# Patient Record
Sex: MALE | Race: WHITE | URBAN - METROPOLITAN AREA
[De-identification: names, ages, dates, MRNs, and addresses within clinical notes are randomized per-mention and may not be internally consistent; named-entity substitution may affect disease eponyms.]

---

## 2018-04-12 ENCOUNTER — EMERGENCY (EMERGENCY)
Facility: HOSPITAL | Age: 83
LOS: 1 days | Discharge: HOME | End: 2018-04-12
Attending: EMERGENCY MEDICINE | Admitting: EMERGENCY MEDICINE
Payer: MEDICARE

## 2018-04-12 VITALS
TEMPERATURE: 98 F | RESPIRATION RATE: 18 BRPM | DIASTOLIC BLOOD PRESSURE: 55 MMHG | OXYGEN SATURATION: 96 % | HEART RATE: 71 BPM | SYSTOLIC BLOOD PRESSURE: 97 MMHG

## 2018-04-12 DIAGNOSIS — Z79.899 OTHER LONG TERM (CURRENT) DRUG THERAPY: ICD-10-CM

## 2018-04-12 DIAGNOSIS — R41.82 ALTERED MENTAL STATUS, UNSPECIFIED: ICD-10-CM

## 2018-04-12 DIAGNOSIS — I10 ESSENTIAL (PRIMARY) HYPERTENSION: ICD-10-CM

## 2018-04-12 DIAGNOSIS — F10.129 ALCOHOL ABUSE WITH INTOXICATION, UNSPECIFIED: ICD-10-CM

## 2018-04-12 DIAGNOSIS — Z79.82 LONG TERM (CURRENT) USE OF ASPIRIN: ICD-10-CM

## 2018-04-12 DIAGNOSIS — I48.91 UNSPECIFIED ATRIAL FIBRILLATION: ICD-10-CM
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
## 2018-04-12 DIAGNOSIS — E78.5 HYPERLIPIDEMIA, UNSPECIFIED: ICD-10-CM

## 2018-04-12 LAB
ALBUMIN SERPL ELPH-MCNC: 3.7 G/DL — SIGNIFICANT CHANGE UP (ref 3.4–5)
ALBUMIN SERPL ELPH-MCNC: 3.9 G/DL — SIGNIFICANT CHANGE UP (ref 3.4–5)
ALP SERPL-CCNC: 72 U/L — SIGNIFICANT CHANGE UP (ref 40–120)
ALP SERPL-CCNC: 76 U/L — SIGNIFICANT CHANGE UP (ref 40–120)
ALT FLD-CCNC: 29 U/L — SIGNIFICANT CHANGE UP (ref 12–42)
ALT FLD-CCNC: 32 U/L — SIGNIFICANT CHANGE UP (ref 12–42)
ANION GAP SERPL CALC-SCNC: 16 MMOL/L — SIGNIFICANT CHANGE UP (ref 9–16)
ANION GAP SERPL CALC-SCNC: 20 MMOL/L — HIGH (ref 9–16)
APPEARANCE UR: CLEAR — SIGNIFICANT CHANGE UP
APTT BLD: 21.1 SEC — LOW (ref 27.5–36.5)
AST SERPL-CCNC: 30 U/L — SIGNIFICANT CHANGE UP (ref 15–37)
AST SERPL-CCNC: 33 U/L — SIGNIFICANT CHANGE UP (ref 15–37)
BASOPHILS NFR BLD AUTO: 1.1 % — SIGNIFICANT CHANGE UP (ref 0–2)
BILIRUB SERPL-MCNC: 1 MG/DL — SIGNIFICANT CHANGE UP (ref 0.2–1.2)
BILIRUB SERPL-MCNC: 1 MG/DL — SIGNIFICANT CHANGE UP (ref 0.2–1.2)
BILIRUB UR-MCNC: NEGATIVE — SIGNIFICANT CHANGE UP
BUN SERPL-MCNC: 28 MG/DL — HIGH (ref 7–23)
BUN SERPL-MCNC: 29 MG/DL — HIGH (ref 7–23)
CALCIUM SERPL-MCNC: 8.8 MG/DL — SIGNIFICANT CHANGE UP (ref 8.5–10.5)
CALCIUM SERPL-MCNC: 9.2 MG/DL — SIGNIFICANT CHANGE UP (ref 8.5–10.5)
CHLORIDE SERPL-SCNC: 100 MMOL/L — SIGNIFICANT CHANGE UP (ref 96–108)
CHLORIDE SERPL-SCNC: 97 MMOL/L — SIGNIFICANT CHANGE UP (ref 96–108)
CO2 SERPL-SCNC: 16 MMOL/L — LOW (ref 22–31)
CO2 SERPL-SCNC: 20 MMOL/L — LOW (ref 22–31)
COLOR SPEC: YELLOW — SIGNIFICANT CHANGE UP
CREAT SERPL-MCNC: 1.69 MG/DL — HIGH (ref 0.5–1.3)
CREAT SERPL-MCNC: 1.9 MG/DL — HIGH (ref 0.5–1.3)
DIFF PNL FLD: NEGATIVE — SIGNIFICANT CHANGE UP
EOSINOPHIL NFR BLD AUTO: 2 % — SIGNIFICANT CHANGE UP (ref 0–6)
ETHANOL SERPL-MCNC: 237 MG/DL — HIGH
GLUCOSE SERPL-MCNC: 86 MG/DL — SIGNIFICANT CHANGE UP (ref 70–99)
GLUCOSE SERPL-MCNC: 91 MG/DL — SIGNIFICANT CHANGE UP (ref 70–99)
GLUCOSE UR QL: NEGATIVE — SIGNIFICANT CHANGE UP
HCT VFR BLD CALC: 41.2 % — SIGNIFICANT CHANGE UP (ref 39–50)
HGB BLD-MCNC: 14.2 G/DL — SIGNIFICANT CHANGE UP (ref 13–17)
IMM GRANULOCYTES NFR BLD AUTO: 0.4 % — SIGNIFICANT CHANGE UP (ref 0–1.5)
INR BLD: 0.89 — SIGNIFICANT CHANGE UP (ref 0.88–1.16)
KETONES UR-MCNC: NEGATIVE — SIGNIFICANT CHANGE UP
LEUKOCYTE ESTERASE UR-ACNC: NEGATIVE — SIGNIFICANT CHANGE UP
LIDOCAIN IGE QN: 187 U/L — SIGNIFICANT CHANGE UP (ref 73–393)
LYMPHOCYTES # BLD AUTO: 22.9 % — SIGNIFICANT CHANGE UP (ref 13–44)
MAGNESIUM SERPL-MCNC: 2.3 MG/DL — SIGNIFICANT CHANGE UP (ref 1.6–2.6)
MCHC RBC-ENTMCNC: 32.3 PG — SIGNIFICANT CHANGE UP (ref 27–34)
MCHC RBC-ENTMCNC: 34.5 G/DL — SIGNIFICANT CHANGE UP (ref 32–36)
MCV RBC AUTO: 93.6 FL — SIGNIFICANT CHANGE UP (ref 80–100)
MONOCYTES NFR BLD AUTO: 6.7 % — SIGNIFICANT CHANGE UP (ref 2–14)
NEUTROPHILS NFR BLD AUTO: 66.9 % — SIGNIFICANT CHANGE UP (ref 43–77)
NITRITE UR-MCNC: NEGATIVE — SIGNIFICANT CHANGE UP
PCO2 BLDV: 38 MMHG — LOW (ref 41–51)
PH BLDV: 7.38 — SIGNIFICANT CHANGE UP (ref 7.32–7.43)
PH UR: 6 — SIGNIFICANT CHANGE UP (ref 5–8)
PLATELET # BLD AUTO: 171 K/UL — SIGNIFICANT CHANGE UP (ref 150–400)
PO2 BLDV: 36 MMHG — SIGNIFICANT CHANGE UP (ref 35–40)
POTASSIUM SERPL-MCNC: 3.9 MMOL/L — SIGNIFICANT CHANGE UP (ref 3.5–5.3)
POTASSIUM SERPL-MCNC: 4.7 MMOL/L — SIGNIFICANT CHANGE UP (ref 3.5–5.3)
POTASSIUM SERPL-SCNC: 3.9 MMOL/L — SIGNIFICANT CHANGE UP (ref 3.5–5.3)
POTASSIUM SERPL-SCNC: 4.7 MMOL/L — SIGNIFICANT CHANGE UP (ref 3.5–5.3)
PROT SERPL-MCNC: 7 G/DL — SIGNIFICANT CHANGE UP (ref 6.4–8.2)
PROT SERPL-MCNC: 7.2 G/DL — SIGNIFICANT CHANGE UP (ref 6.4–8.2)
PROT UR-MCNC: NEGATIVE MG/DL — SIGNIFICANT CHANGE UP
PROTHROM AB SERPL-ACNC: 9.8 SEC — SIGNIFICANT CHANGE UP (ref 9.8–12.7)
RBC # BLD: 4.4 M/UL — SIGNIFICANT CHANGE UP (ref 4.2–5.8)
RBC # FLD: 12.2 % — SIGNIFICANT CHANGE UP (ref 10.3–16.9)
SAO2 % BLDV: 62 % — SIGNIFICANT CHANGE UP
SODIUM SERPL-SCNC: 133 MMOL/L — SIGNIFICANT CHANGE UP (ref 132–145)
SODIUM SERPL-SCNC: 136 MMOL/L — SIGNIFICANT CHANGE UP (ref 132–145)
SP GR SPEC: 1.01 — SIGNIFICANT CHANGE UP (ref 1–1.03)
TROPONIN I SERPL-MCNC: <0.017 NG/ML — LOW (ref 0.02–0.06)
UROBILINOGEN FLD QL: 0.2 E.U./DL — SIGNIFICANT CHANGE UP
WBC # BLD: 8.4 K/UL — SIGNIFICANT CHANGE UP (ref 3.8–10.5)
WBC # FLD AUTO: 8.4 K/UL — SIGNIFICANT CHANGE UP (ref 3.8–10.5)

## 2018-04-12 PROCEDURE — 70450 CT HEAD/BRAIN W/O DYE: CPT | Mod: 26

## 2018-04-12 PROCEDURE — 99284 EMERGENCY DEPT VISIT MOD MDM: CPT

## 2018-04-12 PROCEDURE — 93010 ELECTROCARDIOGRAM REPORT: CPT

## 2018-04-12 PROCEDURE — 71045 X-RAY EXAM CHEST 1 VIEW: CPT | Mod: 26

## 2018-04-12 PROCEDURE — 99218: CPT | Mod: 25

## 2018-04-12 PROCEDURE — 93010 ELECTROCARDIOGRAM REPORT: CPT | Mod: 76,XU

## 2018-04-12 RX ORDER — SODIUM CHLORIDE 9 MG/ML
1000 INJECTION INTRAMUSCULAR; INTRAVENOUS; SUBCUTANEOUS ONCE
Qty: 0 | Refills: 0 | Status: COMPLETED | OUTPATIENT
Start: 2018-04-12 | End: 2018-04-12

## 2018-04-12 RX ORDER — METOPROLOL TARTRATE 50 MG
0 TABLET ORAL
Qty: 0 | Refills: 0 | COMMUNITY

## 2018-04-12 RX ORDER — ATORVASTATIN CALCIUM 80 MG/1
0 TABLET, FILM COATED ORAL
Qty: 0 | Refills: 0 | COMMUNITY

## 2018-04-12 RX ORDER — SODIUM CHLORIDE 9 MG/ML
1000 INJECTION INTRAMUSCULAR; INTRAVENOUS; SUBCUTANEOUS
Qty: 0 | Refills: 0 | Status: DISCONTINUED | OUTPATIENT
Start: 2018-04-12 | End: 2018-04-16

## 2018-04-12 RX ORDER — ASPIRIN/CALCIUM CARB/MAGNESIUM 324 MG
0 TABLET ORAL
Qty: 0 | Refills: 0 | COMMUNITY

## 2018-04-12 RX ORDER — METOPROLOL TARTRATE 50 MG
25 TABLET ORAL ONCE
Qty: 0 | Refills: 0 | Status: COMPLETED | OUTPATIENT
Start: 2018-04-12 | End: 2018-04-12

## 2018-04-12 RX ADMIN — Medication 25 MILLIGRAM(S): at 18:12

## 2018-04-12 RX ADMIN — SODIUM CHLORIDE 2000 MILLILITER(S): 9 INJECTION INTRAMUSCULAR; INTRAVENOUS; SUBCUTANEOUS at 17:49

## 2018-04-12 NOTE — ED PROVIDER NOTE - PROGRESS NOTE DETAILS
Pt w/ a fib new onset. Pt currently not on anti coagulation. Will be admitted overnight for cardiac monitoring, rate control, gentle hydration and electrolyte management as needed. Spoke to PMD Dr Umer [phone], aware that pt is in ED. Agreeable w plan to Obs and hydrate, repeat labs and follow monitoring. Will place on Obs for cardiac monitoring and hydration, cardio eval in AM

## 2018-04-12 NOTE — ED ADULT TRIAGE NOTE - CHIEF COMPLAINT QUOTE
Pt. picked up at [facility] after being found lying on the floor by [facility]. Pt. admits to drinking alcohol today, denies hitting his head, no visible injuries noted.

## 2018-04-12 NOTE — ED PROVIDER NOTE - MEDICAL DECISION MAKING DETAILS
UA, EKG, blood work, fluids and CAT scan. Will do UA, EKG, blood work, fluids and CT scan. Will check labs, EKG, and CT. Gentle hydration.

## 2018-04-12 NOTE — ED ADULT NURSE REASSESSMENT NOTE - NS ED NURSE REASSESS COMMENT FT1
Pt BP noted low, notified MD Wilson, instructed to administer metoprolol as ordered. will continue to monitor via continuous pulse oximetry and cardiac monitor.

## 2018-04-12 NOTE — ED ADULT NURSE REASSESSMENT NOTE - NS ED NURSE REASSESS COMMENT FT1
pt noted in A-fib, placed on cardiac monitor. pt reports he has been told he has a-fib before, reports taking Aspirin daily, denies use of any other blood thinners. pt noted in A-fib, placed on cardiac monitor. pt reports he has been told he has a-fib before, reports taking Aspirin daily, denies use of any other blood thinners, pt reports taking metoprolol today.

## 2018-04-12 NOTE — ED ADULT NURSE NOTE - CAS EDN DISCHARGE ASSESSMENT
steady gait/Alert and oriented to person, place and time/No adverse reaction to first time med in ED/Symptoms improved

## 2018-04-12 NOTE — ED CDU PROVIDER INITIAL DAY NOTE - MEDICAL DECISION MAKING DETAILS
Plan: obs for cardiac monitoring, repeat labs, gentle hydration, cardio eval in AM. NPO after midnight. New onset afib

## 2018-04-12 NOTE — ED PROVIDER NOTE - NEUROLOGICAL, MLM
minor slurred speech, gate not assessed, normal strength in all extremities, symmetric smile, alert and oriented.

## 2018-04-12 NOTE — ED ADULT NURSE REASSESSMENT NOTE - NS ED NURSE REASSESS COMMENT FT1
no further complaints offered at this time.  second set cardiac enzymes resulted.  Remains in stretcher on cardiac monitor.  Hemodynamic stability noted.

## 2018-04-12 NOTE — ED PROVIDER NOTE - DIAGNOSTIC INTERPRETATION
Chest x-ray, 1 view  Lungs clear, heart shadow normal, bony structures normal, no free air under diaphragm, no PTX

## 2018-04-12 NOTE — ED ADULT NURSE NOTE - OBJECTIVE STATEMENT
pt is an 84 year old male who comes into the ED after being found at [facility] on the floor. pt states "I was having lunch with my , he came late and I had a few too many drinks, I then went to [facility] and my knees gave out". pt denies falling, reports people helped him to the floor. denies hitting his head, LOC. pt a&ox3. no trauma noted. denies dizziness, sob, chest pain, weakness.

## 2018-04-12 NOTE — ED ADULT NURSE NOTE - CHIEF COMPLAINT QUOTE
Pt. picked up at [facility] after being found lying on the floor by [facility]. Pt. admits to drinking alcohol today, denies hitting his head, no visible injuries noted.

## 2018-04-12 NOTE — ED PROVIDER NOTE - OBJECTIVE STATEMENT
83 y/o M w/ PMHx of HTN, HLD and atrial fib (5yr ago) presents to ED w/ c/o leg weakness brought via EMS. Pt reports going down the stairs in [place], losing strength in legs and stumbling down. Pt admits having about 5/6 drinks prior to occurrence. Pt denies hitting his head, SOB, N/Vm abd pain.  Pt also reports being on aspirin 81mg and seeing a neurologist once for peripheral nerve testing per tingling/pain in L leg. 83 y/o M w/ PMHx of HTN, HLD and atrial fib (5yr ago) was brought by EMS for AMS after being found at [place] on the floor. Pt recalls falling down the stairs which he attributes to leg weakness. Pt admits having about 5/6 drinks prior to occurrence. Pt denies any pain, hitting his head, SOB, N/Vm, and abd pain.  Pt also reports being on aspirin 81mg and seeing a neurologist once for peripheral nerve testing per tingling/pain in L leg.

## 2018-04-13 VITALS
SYSTOLIC BLOOD PRESSURE: 112 MMHG | HEART RATE: 89 BPM | RESPIRATION RATE: 16 BRPM | DIASTOLIC BLOOD PRESSURE: 82 MMHG | OXYGEN SATURATION: 99 % | TEMPERATURE: 98 F

## 2018-04-13 LAB
CK MB BLD-MCNC: 1.83 % — SIGNIFICANT CHANGE UP
CK MB CFR SERPL CALC: 2.8 NG/ML — SIGNIFICANT CHANGE UP (ref 0.5–3.6)
CK SERPL-CCNC: 153 U/L — SIGNIFICANT CHANGE UP (ref 39–308)
TROPONIN I SERPL-MCNC: <0.017 NG/ML — LOW (ref 0.02–0.06)

## 2018-04-13 PROCEDURE — 99217: CPT

## 2018-04-13 RX ORDER — APIXABAN 2.5 MG/1
2.5 TABLET, FILM COATED ORAL ONCE
Qty: 0 | Refills: 0 | Status: COMPLETED | OUTPATIENT
Start: 2018-04-13 | End: 2018-04-13

## 2018-04-13 RX ORDER — METOPROLOL TARTRATE 50 MG
2.5 TABLET ORAL ONCE
Qty: 0 | Refills: 0 | Status: COMPLETED | OUTPATIENT
Start: 2018-04-13 | End: 2018-04-13

## 2018-04-13 RX ORDER — APIXABAN 2.5 MG/1
1 TABLET, FILM COATED ORAL
Qty: 28 | Refills: 0 | OUTPATIENT
Start: 2018-04-13 | End: 2018-04-26

## 2018-04-13 RX ORDER — METOPROLOL TARTRATE 50 MG
50 TABLET ORAL ONCE
Qty: 0 | Refills: 0 | Status: COMPLETED | OUTPATIENT
Start: 2018-04-13 | End: 2018-04-13

## 2018-04-13 RX ORDER — ONDANSETRON 8 MG/1
4 TABLET, FILM COATED ORAL ONCE
Qty: 0 | Refills: 0 | Status: COMPLETED | OUTPATIENT
Start: 2018-04-13 | End: 2018-04-13

## 2018-04-13 RX ADMIN — SODIUM CHLORIDE 250 MILLILITER(S): 9 INJECTION INTRAMUSCULAR; INTRAVENOUS; SUBCUTANEOUS at 10:25

## 2018-04-13 RX ADMIN — Medication 50 MILLIGRAM(S): at 08:28

## 2018-04-13 RX ADMIN — APIXABAN 2.5 MILLIGRAM(S): 2.5 TABLET, FILM COATED ORAL at 09:29

## 2018-04-13 RX ADMIN — ONDANSETRON 4 MILLIGRAM(S): 8 TABLET, FILM COATED ORAL at 01:35

## 2018-04-13 RX ADMIN — Medication 10 MILLIGRAM(S): at 02:35

## 2018-04-13 RX ADMIN — Medication 2.5 MILLIGRAM(S): at 09:24

## 2018-04-13 RX ADMIN — SODIUM CHLORIDE 250 MILLILITER(S): 9 INJECTION INTRAMUSCULAR; INTRAVENOUS; SUBCUTANEOUS at 09:24

## 2018-04-13 RX ADMIN — SODIUM CHLORIDE 250 MILLILITER(S): 9 INJECTION INTRAMUSCULAR; INTRAVENOUS; SUBCUTANEOUS at 08:28

## 2018-04-13 NOTE — ED ADULT NURSE REASSESSMENT NOTE - NS ED NURSE REASSESS COMMENT FT1
received report, pt on observation for AMS/ETOH with AFIB. Currently pt is A&ox3, offers no complaints of CP or SOB. Currently Pt is AFIB on monitor 100-115, awaiting cardiology evaluation. Pt admits to heavy etoh at a "business lunch" yesterday, and inconsistency with Metoprolol 50mg at home. Will discuss with MD and f/u as indicated. Fall precautions, call bell remains in reach.

## 2018-04-13 NOTE — ED CDU PROVIDER SUBSEQUENT DAY NOTE - PROGRESS NOTE DETAILS
Observed overnight, improved renal function w hydration, 2 sets of normal trop, EKG still afib rate controlled. Evaluated by Cardio in AM, normal EF on ECHO, moderate aortic insuf. Deemed safe to take eliquis bid, rate control (already on metoprolol), discussed case with his cardiologist Dr Dumont in AM as well and he will be able to foolow up w pt within then next 2 weeks. Explained to pt risk of bleeding on eliquis, if any abnormal bleeding RTER, if any head trauma RTER for evaluation, reduce drinking since that is a risk for falls.

## 2018-04-13 NOTE — ED ADULT NURSE REASSESSMENT NOTE - NS ED NURSE REASSESS COMMENT FT1
pt with improvement in vitals, started on Eliquis PO. Pt provided education in length by both writer and MD on blood thinners. Pt verbalized understanding. Pt will be discharged once meal is competed, he plans to take cab to [facility] where he has family meeting him at the [facility] to go home.

## 2018-04-13 NOTE — ED ADULT NURSE REASSESSMENT NOTE - NS ED NURSE REASSESS COMMENT FT1
pt vomiting in the ed. given zofran 4mg IVP. Placed on continuous telemetry. Pending blood work done. Updated with plan of care.

## 2018-04-13 NOTE — CONSULT NOTE ADULT - SUBJECTIVE AND OBJECTIVE BOX
[facility] Cardiology Consultation    CHIEF COMPLAINT: palpitaitons    HISTORY OF PRESENT ILLNESS: 85 y/o male with history of HTN presents secondary to dizziness. Patient noted to be intoxicated. He was in AF. It's unknown if this is an old or new finding. He remarks on possibly having these symptoms in the past. He follows with a Dr Dumont at [place].     PAST MEDICAL & SURGICAL HISTORY:  HTN  Dilated aortic root    Allergies No Known Allergies    MEDICATIONS:  metoprolol  exforge     FAMILY HISTORY: no CAD    SOCIAL HISTORY:  social EtOH, tobacco or drug use    REVIEW OF SYSTEMS:  CONSTITUTIONAL: No fever, weight loss, or fatigue  EYES: No eye pain, visual disturbances, or discharge  ENMT:  No difficulty hearing, tinnitus, vertigo; No sinus or throat pain  NECK: No pain or stiffness  BREASTS: No pain, masses, or nipple discharge  RESPIRATORY: No cough, wheezing, chills or hemoptysis; No Shortness of Breath  CARDIOVASCULAR: No chest pain, palpitations, dizziness, or leg swelling  GASTROINTESTINAL: No abdominal or epigastric pain. No nausea, vomiting, or hematemesis; No diarrhea or constipation. No melena or hematochezia.  GENITOURINARY: No dysuria, frequency, hematuria, or incontinence  NEUROLOGICAL: No headaches, memory loss, loss of strength, numbness, or tremors  SKIN: No itching, burning, rashes, or lesions   LYMPH Nodes: No enlarged glands  ENDOCRINE: No heat or cold intolerance; No hair loss  MUSCULOSKELETAL: No joint pain or swelling; No muscle, back, or extremity pain  PSYCHIATRIC: No depression, anxiety, mood swings, or difficulty sleeping  HEME/LYMPH: No easy bruising, or bleeding gums  ALLERY AND IMMUNOLOGIC: No hives or eczema	      PHYSICAL EXAM:  T(C): 36.7 (04-13-18 @ 09:45), Max: 37.1 (04-12-18 @ 21:35)  HR: 89 (04-13-18 @ 09:45) (71 - 115)  BP: 112/82 (04-13-18 @ 09:45) (94/60 - 134/61)  RR: 16 (04-13-18 @ 09:45) (16 - 20)  SpO2: 99% (04-13-18 @ 09:45) (95% - 99%)  Appearance: elderly man NAD  HEENT:   Normal oral mucosa, PERRL, EOMI	  Lymphatic: No lymphadenopathy  Cardiovascular: Normal S1 S2, No JVD, No murmurs, No edema  Respiratory: Lungs clear to auscultation	  Psychiatry: A & O x 3, Mood & affect appropriate  Gastrointestinal:  Soft, Non-tender, + BS	  Skin: No rashes, No ecchymoses, No cyanosis	  Neurologic: Non-focal  Extremities: Normal range of motion, No clubbing, cyanosis or edema  Vascular: Peripheral pulses palpable 2+ bilaterally  	    ECG:  	AF no ST_t changes     LABS:	 	  CARDIAC MARKERS:  Troponin I, Serum: <0.017 ng/mL (04-13 @ 01:50)  Troponin I, Serum: <0.017 ng/mL (04-12 @ 22:03)      136  |  100  |  29<H>  ----------------------------<  91  3.9   |  20<L>  |  1.69<H>    Ca    8.8      12 Apr 2018 22:03  Mg     2.3     04-12    TPro  7.0  /  Alb  3.9  /  TBili  1.0  /  DBili  x   /  AST  30  /  ALT  29  /  AlkPhos  76  04-12        ASSESSMENT/PLAN: 	85 y/o male with AF  1. patient seen and examined, chart reviewed   2. impoving overall after fluids and AF appears to be rate controlled  3. limited bedside echo done, c/w preserved LVEF mild LVH, mild MR. mild LAE, mildly dilated aortic root with at least mod AI  4. recommend eliquis 2.5 mg PO BID  5. cont metoprolol and follow up with the primary care doctor and cardiologist at [facility]    I spent 45 min in care of this patient

## 2024-02-01 NOTE — ED PROVIDER NOTE - CARE PLAN
LM on spouse's voicemail regarding calling office to schedule appt    Principal Discharge DX:	Alcohol intoxication  Secondary Diagnosis:	Atrial fibrillation